# Patient Record
Sex: FEMALE | Race: WHITE
[De-identification: names, ages, dates, MRNs, and addresses within clinical notes are randomized per-mention and may not be internally consistent; named-entity substitution may affect disease eponyms.]

---

## 2017-03-17 ENCOUNTER — HOSPITAL ENCOUNTER (OUTPATIENT)
Dept: HOSPITAL 45 - C.MAMM | Age: 47
Discharge: HOME | End: 2017-03-17
Attending: OBSTETRICS & GYNECOLOGY
Payer: COMMERCIAL

## 2017-03-17 DIAGNOSIS — Z12.31: Primary | ICD-10-CM

## 2017-03-17 NOTE — MAMMOGRAPHY REPORT
BILATERAL DIGITAL SCREENING MAMMOGRAM TOMOSYNTHESIS WITH CAD: 3/17/2017

CLINICAL HISTORY: Routine screening.  Patient has no complaints.  





TECHNIQUE:  Breast tomosynthesis in addition to standard 2D mammography was performed. Current study
 was also evaluated with a Computer Aided Detection (CAD) system.  



COMPARISON: Comparison is made to exams dated:  3/9/2016 mammogram, 12/19/2013 mammogram, 1/29/2015 
mammogram, 12/6/2012 mammogram, and 12/1/2011 mammogram - [facility].   



BREAST COMPOSITION:  The tissue of both breasts is heterogeneously dense, which may obscure small ma
sses.  



FINDINGS:  The parenchymal pattern is unchanged. No developing mass, architectural distortion or clu
ster of suspicious microcalcifications is seen in either breast.  





IMPRESSION:  ACR BI-RADS CATEGORY 2: BENIGN

There is no mammographic evidence of malignancy. A 1 year screening mammogram is recommended.  The p
atient will receive written notification of the results.  





Approximately 10% of breast cancers are not detected with mammography. A negative mammographic repor
t should not delay biopsy if a clinically suggestive mass is present.



Marielos Salvador M.D.          

ay/:3/17/2017 12:48:50  



Imaging Technologist: Gildardo LOCKWOOD(R)(M), [facility]

letter sent: Normal 1/2  

BI-RADS Code: ACR BI-RADS Category 2: Benign

## 2018-03-27 ENCOUNTER — HOSPITAL ENCOUNTER (OUTPATIENT)
Dept: HOSPITAL 45 - C.MAMM | Age: 48
Discharge: HOME | End: 2018-03-27
Attending: PHYSICIAN ASSISTANT
Payer: COMMERCIAL

## 2018-03-27 DIAGNOSIS — Z12.31: Primary | ICD-10-CM

## 2018-03-29 NOTE — MAMMOGRAPHY REPORT
BILATERAL DIGITAL SCREENING MAMMOGRAM TOMOSYNTHESIS WITH CAD: 3/27/2018

CLINICAL HISTORY: Routine screening.  





TECHNIQUE:  Breast tomosynthesis in addition to standard 2D mammography was performed. Current study 
was also evaluated with a Computer Aided Detection (CAD) system.  



COMPARISON: Comparison is made to exams dated:  3/17/2017 mammogram, 3/9/2016 mammogram, 1/29/2015 ma
mmogram, 12/19/2013 mammogram, 12/6/2012 mammogram, and 12/1/2011 mammogram - [facility]
enter.   



BREAST COMPOSITION:  The tissue of both breasts is heterogeneously dense, which may obscure small mas
ses.  



FINDINGS:  The parenchymal pattern is unchanged. No developing mass, architectural distortion or clus
ter of suspicious microcalcifications is seen in either breast.  





IMPRESSION:  ACR BI-RADS CATEGORY 2: BENIGN

There is no mammographic evidence of malignancy. A 1 year screening mammogram is recommended.  The pa
tient will receive written notification of the results.  





Approximately 10% of breast cancers are not detected with mammography. A negative mammographic report
 should not delay biopsy if a clinically suggestive mass is present.



Marielos Salvador M.D.          

ay/:3/27/2018 16:27:49  



Imaging Technologist: Gildardo LOCKWOOD(R)(M), [facility]

letter sent: Normal 1/2  

BI-RADS Code: ACR BI-RADS Category 2: Benign

## 2018-04-29 ENCOUNTER — HOSPITAL ENCOUNTER (EMERGENCY)
Dept: HOSPITAL 45 - C.EDB | Age: 48
Discharge: HOME | End: 2018-04-29
Payer: COMMERCIAL

## 2018-04-29 VITALS
HEIGHT: 64.02 IN | BODY MASS INDEX: 33.46 KG/M2 | BODY MASS INDEX: 33.46 KG/M2 | WEIGHT: 195.99 LBS | HEIGHT: 64.02 IN | WEIGHT: 195.99 LBS

## 2018-04-29 VITALS — TEMPERATURE: 98.06 F

## 2018-04-29 VITALS — HEART RATE: 65 BPM | SYSTOLIC BLOOD PRESSURE: 146 MMHG | DIASTOLIC BLOOD PRESSURE: 85 MMHG | OXYGEN SATURATION: 98 %

## 2018-04-29 DIAGNOSIS — M54.42: Primary | ICD-10-CM

## 2018-04-29 NOTE — EMERGENCY ROOM VISIT NOTE
History


First contact with patient:  13:02


Chief Complaint:  BACK PAIN


Stated Complaint:  LOWER BACK PAIN





History of Present Illness


The patient is a 47 year old female who presents to the Emergency Room with 

complaints of low back pain radiating into her left side leg.  The patient 

believes she initially hurt herself 3 days ago when lifting a container of milk 

in her barn.  She has had some back problems in the past, but these typically 

improve over a few days.  She states her symptoms this time have been slowly 

worsening over the past 3 days which is atypical for her.  She is having 

difficulty standing and walking as this worsens her pain.  When she rests 

things do get better.  She did take ibuprofen this morning with only minimal 

improvement of symptoms.  The patient has been able to use the bathroom as 

normal, and does not report incontinence.  No recent fevers or chills.  No 

history of back surgery.  She rates her current discomfort a 7/10.





Review of Systems


More than 10 systems were reviewed and otherwise negative with the exception of 

history of present illness.





Past Medical/Surgical History


Medical Problems:


(1) Failed hormonal therapy








Family History


No pertinent family history





Social History


Smoking Status:  Never Smoker


Housing Status:  lives with family





Current/Historical Medications


Scheduled


Cyclobenzaprine Hcl (Flexeril), 10 MG PO TID


Diclofenac Sodium (Voltaren), 75 MG PO BID


Prednisone (Prednisone Tab), 2 TAB PO DAILY





Physical Exam


Vital Signs











  Date Time  Temp Pulse Resp B/P (MAP) Pulse Ox O2 Delivery O2 Flow Rate FiO2


 


4/29/18 15:04  65 18 146/85 98   


 


4/29/18 12:45 36.7 68 18 131/84 98 Room Air  











Physical Exam


VITALS: Vitals are noted on the nurse's note and reviewed by myself.  Vital 

signs stable.


GENERAL: Well-developed, well-nourished, white female, who appears 

uncomfortable in her emergency department bed.  Changing positions seems to 

worsen her discomfort.


NECK: Supple without nuchal rigidity.  No lymphadenopathy.  No thyromegaly.  

Cervical spine is nontender.  


HEART: Regular rate and rhythm without murmurs gallops or rubs.


LUNGS: Clear to auscultation bilaterally without wheezes, rales or rhonchi.  No 

retractions or accessory muscle use.


ABDOMEN: Positive normal bowel sounds x 4.  Soft, nontender, without masses or 

organomegaly.  No guarding or rebound tenderness.


MUSCULOSKELETAL: There is tenderness appreciated in the lower lumbar spine and 

left SI joint.  No spinous process step-off or significant paravertebral spasm 

noted.  No saddle paresthesias.  Positive straight leg raise on the left but 

not the right.  Neurovascular status is intact distally.


NEURO: Patient was alert and oriented to person place and time. CN II through 

XII grossly intact. No focal neurological deficits. Deep tendon reflexes 2+ 

throughout. 


SKIN: The skin was without rashes, erythema, edema, or bruising.  Capillary 

refill less than 2 seconds.





Medical Decision & Procedures


ER Provider


Diagnostic Interpretation:











LUMBAR SPINE 5 VIEWS





CLINICAL HISTORY: Low back pain.





FINDINGS: 5 views of the lumbar spine are  obtained. No prior studies are


available for comparison at the time of dictation.  The skeletal structures are


well mineralized. There is no radiographic evidence of fracture or malalignment.


Vertebral body height and alignment are maintained. The transverse and spinous


processes are intact. Tiny anterior osteophytes are seen in the lower lumbar


spine. There is no evidence of spondylolysis. The intervertebral disc spaces are


well-maintained. The visualized bony pelvis appears intact. There is a


nonobstructed abdominal bowel gas pattern.





IMPRESSION: Unremarkable radiographic evaluation of the lumbosacral spine.











Medications Administered











 Medications


  (Trade)  Dose


 Ordered  Sig/Umberto


 Route  Start Time


 Stop Time Status Last Admin


Dose Admin


 


 Ketorolac


 Tromethamine


  (Toradol Inj)  60 mg  NOW  STAT


 IM  4/29/18 13:12


 4/29/18 13:14 DC 4/29/18 13:25


60 MG


 


 Dexamethasone


 Sodium Phosphate


  (Dexamethasone


 Inj **Pf**)  10 mg  NOW  ONCE


 IM  4/29/18 13:15


 4/29/18 13:16 DC 4/29/18 13:25


10 MG


 


 Cyclobenzaprine


 HCl


  (Flexeril Tab)  10 mg  NOW  STAT


 PO  4/29/18 13:12


 4/29/18 13:14 DC 4/29/18 13:24


10 MG











ED Course


Physical exam and history were performed. Nursing notes, EMR, and Medication 

List were personally reviewed. 





Patient appears to have left-sided low back pain with radiation down her left 

leg.  Her exam and history is not consistent with cauda equina or spinal 

abscess.  She is palpably tender and is uncomfortable with movement in her ER 

bed.  I discussed options of care with the patient.  She evidently has 

significant nausea with narcotic products.  Because of this she was given IM 

Toradol, IM Decadron, and oral Flexeril.  I did elect perform x-rays.





The patient's x-rays are as above and were reviewed by myself and radiology as 

showing no acute process.  On reevaluation the patient was able to rest much 

more comfortably in her ER bed.  I discussed options of care with the family, 

and she does feel comfortable with discharge home.  I will give her a course of 

diclofenac, steroids, and muscle relaxers.  The patient should follow with her 

primary care physician later this week for a recheck of her condition.  She was 

certainly invited back to the ER with any new, worsening, or concerning 

symptoms.





The chart was completed utilizing Dragon Speech Voice Recognition Software. 

Grammatical errors, random word insertions, pronoun errors, and incomplete 

sentences are an occasional consequence of this system due to software 

limitations, ambient noise, and hardware issues. Any formal questions or 

concerns about the content, text, or information contained within the body of 

this dictation should be directly addressed to the provider for clarification.


.





Medical Decision


Differential diagnosis:


Etiologies such as musculoskeletal, disc herniation, fracture, aortic disease, 

metastatic disease, cord compression, discitis, infection, renal colic, 

gastrointestinal, acute exacerbation of chronic back pain, sciatica, cauda 

equina, as well as others were entertained.





Impression





 Primary Impression:  


 Low back pain with sciatica





Departure Information


Dispostion


Home / Self-Care





Condition


GOOD





Prescriptions





Prednisone (Prednisone Tab) 20 Mg Tab


2 TAB PO DAILY for 5 Days, #10 TAB


   Prov: Alexandro Azul PA-C         4/29/18 


Cyclobenzaprine Hcl (FLEXERIL) 10 Mg Tab


10 MG PO TID for 7 Days, #21 TAB


   Prov: Alexandro Azul PA-C         4/29/18 


Diclofenac Sodium (VOLTAREN) 75 Mg Tab


75 MG PO BID for 30 Days, #60 TAB


   Prov: Alexandro Azul PA-C         4/29/18





Forms


HOME CARE DOCUMENTATION FORM,                                                 

               IMPORTANT VISIT INFORMATION





Patient Instructions


[facility]





Additional Instructions





You were seen and evaluated today on an emergency basis only.  This is not a 

substitute for, or an effort to provide, complete comprehensive medical care.  

It is not possible to recognize and treat all injuries or illnesses in a single 

emergency department visit. For this reason it is recommended that you followup 

with your primary care physician this week for recheck of your condition.  





Take diclofenac 75 mg twice daily with food for the next 7 days.





You may take Tylenol 1000 mg every 6-8 hours. 





Flexeril 1 tablet up to 3 times a day as needed for muscle spasms.  No driving, 

working, or alcohol use with Flexeril.





Take prednisone 40 mg daily for the next 5 days.  We recommend taking this in 

the morning as it can be difficult to sleep if this is taken too late in the 

day.





You are welcome to return to the emergency department anytime with new, 

worsening, or concerning symptoms.

## 2018-04-29 NOTE — DIAGNOSTIC IMAGING REPORT
LUMBAR SPINE 5 VIEWS



CLINICAL HISTORY: Low back pain.



FINDINGS: 5 views of the lumbar spine are  obtained. No prior studies are

available for comparison at the time of dictation.  The skeletal structures are

well mineralized. There is no radiographic evidence of fracture or malalignment.

Vertebral body height and alignment are maintained. The transverse and spinous

processes are intact. Tiny anterior osteophytes are seen in the lower lumbar

spine. There is no evidence of spondylolysis. The intervertebral disc spaces are

well-maintained. The visualized bony pelvis appears intact. There is a

nonobstructed abdominal bowel gas pattern.



IMPRESSION: Unremarkable radiographic evaluation of the lumbosacral spine.







Electronically signed by:  Kashmir Cintron M.D.

4/29/2018 2:15 PM



Dictated Date/Time:  4/29/2018 2:14 PM